# Patient Record
Sex: FEMALE | Race: WHITE | Employment: UNEMPLOYED | URBAN - METROPOLITAN AREA
[De-identification: names, ages, dates, MRNs, and addresses within clinical notes are randomized per-mention and may not be internally consistent; named-entity substitution may affect disease eponyms.]

---

## 2017-12-28 ENCOUNTER — HOSPITAL ENCOUNTER (EMERGENCY)
Facility: HOSPITAL | Age: 52
Discharge: HOME | End: 2017-12-28
Payer: MEDICAID

## 2017-12-28 ENCOUNTER — APPOINTMENT (OUTPATIENT)
Dept: GENERAL RADIOLOGY | Facility: HOSPITAL | Age: 52
End: 2017-12-28
Payer: MEDICAID

## 2017-12-28 VITALS
BODY MASS INDEX: 27.05 KG/M2 | DIASTOLIC BLOOD PRESSURE: 73 MMHG | RESPIRATION RATE: 22 BRPM | OXYGEN SATURATION: 96 % | SYSTOLIC BLOOD PRESSURE: 171 MMHG | HEART RATE: 111 BPM | HEIGHT: 62 IN | TEMPERATURE: 100 F | WEIGHT: 147 LBS

## 2017-12-28 DIAGNOSIS — J45.901 EXACERBATION OF ASTHMA, UNSPECIFIED ASTHMA SEVERITY, UNSPECIFIED WHETHER PERSISTENT: ICD-10-CM

## 2017-12-28 DIAGNOSIS — J18.0 BRONCHOPNEUMONIA: Primary | ICD-10-CM

## 2017-12-28 PROCEDURE — 71010 XR CHEST AP/PA (1 VIEW) (CPT=71010): CPT

## 2017-12-28 PROCEDURE — 99284 EMERGENCY DEPT VISIT MOD MDM: CPT

## 2017-12-28 PROCEDURE — 94640 AIRWAY INHALATION TREATMENT: CPT

## 2017-12-28 RX ORDER — DOXYCYCLINE 100 MG/1
100 CAPSULE ORAL ONCE
Status: COMPLETED | OUTPATIENT
Start: 2017-12-28 | End: 2017-12-28

## 2017-12-28 RX ORDER — PREDNISONE 20 MG/1
60 TABLET ORAL ONCE
Status: COMPLETED | OUTPATIENT
Start: 2017-12-28 | End: 2017-12-28

## 2017-12-28 RX ORDER — PREDNISONE 20 MG/1
60 TABLET ORAL DAILY
Qty: 12 TABLET | Refills: 0 | Status: SHIPPED | OUTPATIENT
Start: 2017-12-28 | End: 2018-01-01

## 2017-12-28 RX ORDER — IPRATROPIUM BROMIDE AND ALBUTEROL SULFATE 2.5; .5 MG/3ML; MG/3ML
3 SOLUTION RESPIRATORY (INHALATION) ONCE
Status: COMPLETED | OUTPATIENT
Start: 2017-12-28 | End: 2017-12-28

## 2017-12-28 RX ORDER — DOXYCYCLINE HYCLATE 100 MG/1
100 CAPSULE ORAL 2 TIMES DAILY
Qty: 20 CAPSULE | Refills: 0 | Status: SHIPPED | OUTPATIENT
Start: 2017-12-28 | End: 2018-01-07

## 2017-12-28 NOTE — ED INITIAL ASSESSMENT (HPI)
Pt to ER with c/o jeremy since yesterday. Pt with hx of asthma and states she has used all her \"treatments\" at home. Pt 95% on room air. Pt able to speak in full sentences. Bilateral lung sounds diminished. Pt denies fever at home. +new cough per patient.

## 2017-12-28 NOTE — ED NOTES
Pt states she has h/o asthma. Was at work today and started to have problems breathing. States had the stomach bug over the weekend. Has used her rescue inhaler and her breathing treatment several times over the past couple days. Denies fevers.

## 2017-12-29 NOTE — ED PROVIDER NOTES
Patient Seen in: [facility] AND [facility] Emergency Department    History   Patient presents with:  Dyspnea JAMEL SOB (respiratory)    Stated Complaint: cough, sob     HPI    46 yo F with PMH asthma presenting for evaluation of several days of worsening cough/dyspn Oral  SpO2: 95 %  O2 Device: None (Room air)    Current:BP (!) 149/125   Pulse 109   Temp 98.3 °F (36.8 °C) (Oral)   Resp 20   Ht 157.5 cm (5' 2\")   Wt 66.7 kg   SpO2 95%   BMI 26.89 kg/m²         Physical Exam   Constitutional: No distress. HEENT: MMM. as interpreted by myself    Evaluation for cough/wheezing without overt pain in nontoxic, well-appearing patient - CXR as noted in AF/HDS, notnoxic patient, will initiate abx. Improved after additional neb, patient desiring DC home.  DC home with abx as not

## 2018-02-20 ENCOUNTER — APPOINTMENT (OUTPATIENT)
Dept: GENERAL RADIOLOGY | Facility: HOSPITAL | Age: 53
End: 2018-02-20
Attending: NURSE PRACTITIONER
Payer: MEDICARE

## 2018-02-20 ENCOUNTER — HOSPITAL ENCOUNTER (EMERGENCY)
Facility: HOSPITAL | Age: 53
Discharge: HOME | End: 2018-02-20
Payer: MEDICARE

## 2018-02-20 VITALS
BODY MASS INDEX: 26.31 KG/M2 | HEART RATE: 78 BPM | TEMPERATURE: 98 F | DIASTOLIC BLOOD PRESSURE: 64 MMHG | WEIGHT: 143 LBS | HEIGHT: 62 IN | OXYGEN SATURATION: 98 % | RESPIRATION RATE: 20 BRPM | SYSTOLIC BLOOD PRESSURE: 107 MMHG

## 2018-02-20 DIAGNOSIS — S82.891A CLOSED FRACTURE OF RIGHT ANKLE, INITIAL ENCOUNTER: Primary | ICD-10-CM

## 2018-02-20 PROCEDURE — 73610 X-RAY EXAM OF ANKLE: CPT | Performed by: NURSE PRACTITIONER

## 2018-02-20 PROCEDURE — 99284 EMERGENCY DEPT VISIT MOD MDM: CPT

## 2018-02-20 PROCEDURE — 73590 X-RAY EXAM OF LOWER LEG: CPT | Performed by: NURSE PRACTITIONER

## 2018-02-20 RX ORDER — IBUPROFEN 600 MG/1
600 TABLET ORAL ONCE
Status: COMPLETED | OUTPATIENT
Start: 2018-02-20 | End: 2018-02-20

## 2018-02-20 RX ORDER — HYDROCODONE BITARTRATE AND ACETAMINOPHEN 5; 325 MG/1; MG/1
1-2 TABLET ORAL EVERY 4 HOURS PRN
Qty: 15 TABLET | Refills: 0 | Status: SHIPPED | OUTPATIENT
Start: 2018-02-20 | End: 2018-02-27

## 2018-02-20 RX ORDER — TRAMADOL HYDROCHLORIDE 50 MG/1
TABLET ORAL EVERY 4 HOURS PRN
Qty: 15 TABLET | Refills: 0 | Status: SHIPPED | OUTPATIENT
Start: 2018-02-20 | End: 2018-02-20 | Stop reason: CLARIF

## 2018-02-20 RX ORDER — TRAMADOL HYDROCHLORIDE 50 MG/1
50 TABLET ORAL ONCE
Status: COMPLETED | OUTPATIENT
Start: 2018-02-20 | End: 2018-02-20

## 2018-02-20 NOTE — ED PROVIDER NOTES
Patient Seen in: [facility] AND [facility] Emergency Department    History   Patient presents with:  Lower Extremity Injury (musculoskeletal)    Stated Complaint: Fall last night - Right ankle pain    Patient presents into the emergency room for evaluation of he right knee tibial plateau tenderness or deformity. There is tenderness on palpation to the lateral aspect of the right mid lower leg. No palpable tenderness to the medial malleolar area.   There is marked tenderness accompanied with moderate swelling to t as needed.   Qty: 15 tablet Refills: 0              Alyson November DEWEYP

## 2018-02-20 NOTE — CM/SW NOTE
Met with patient for discharge planning. She will need orthopedic follow up. Initially insurance listed was [place] [employer] yet it is showing not eligible.  She said she was just approved for social security disability and therefore may be medicare eligib

## 2018-02-26 PROBLEM — Z01.818 PRE-OP TESTING: Status: ACTIVE | Noted: 2018-02-26

## 2018-02-26 PROBLEM — S82.61XA CLOSED DISPLACED FRACTURE OF LATERAL MALLEOLUS OF RIGHT FIBULA: Status: ACTIVE | Noted: 2018-02-26

## 2018-02-26 PROBLEM — M25.571 ACUTE RIGHT ANKLE PAIN: Status: ACTIVE | Noted: 2018-02-26

## 2018-02-27 ENCOUNTER — LAB ENCOUNTER (OUTPATIENT)
Dept: LAB | Facility: HOSPITAL | Age: 53
DRG: 494 | End: 2018-02-27
Attending: ORTHOPAEDIC SURGERY
Payer: MEDICARE

## 2018-02-27 ENCOUNTER — HOSPITAL ENCOUNTER (OUTPATIENT)
Dept: GENERAL RADIOLOGY | Facility: HOSPITAL | Age: 53
Discharge: HOME | DRG: 494 | End: 2018-02-27
Attending: ORTHOPAEDIC SURGERY
Payer: MEDICARE

## 2018-02-27 DIAGNOSIS — Z01.818 PRE-OP TESTING: ICD-10-CM

## 2018-02-27 LAB
ALBUMIN SERPL BCP-MCNC: 3.8 G/DL (ref 3.5–4.8)
ALBUMIN/GLOB SERPL: 1.2 {RATIO} (ref 1–2)
ALP SERPL-CCNC: 46 U/L (ref 32–100)
ALT SERPL-CCNC: 25 U/L (ref 14–54)
ANION GAP SERPL CALC-SCNC: 9 MMOL/L (ref 0–18)
AST SERPL-CCNC: 26 U/L (ref 15–41)
BACTERIA UR QL AUTO: NEGATIVE /HPF
BASOPHILS # BLD: 0.1 K/UL (ref 0–0.2)
BASOPHILS NFR BLD: 1 %
BILIRUB SERPL-MCNC: 0.6 MG/DL (ref 0.3–1.2)
BILIRUB UR QL: NEGATIVE
BUN SERPL-MCNC: 15 MG/DL (ref 8–20)
BUN/CREAT SERPL: 27.3 (ref 10–20)
CALCIUM SERPL-MCNC: 9.2 MG/DL (ref 8.5–10.5)
CHLORIDE SERPL-SCNC: 102 MMOL/L (ref 95–110)
CLARITY UR: CLEAR
CO2 SERPL-SCNC: 29 MMOL/L (ref 22–32)
COLOR UR: YELLOW
CREAT SERPL-MCNC: 0.55 MG/DL (ref 0.5–1.5)
EOSINOPHIL # BLD: 0.2 K/UL (ref 0–0.7)
EOSINOPHIL NFR BLD: 2 %
ERYTHROCYTE [DISTWIDTH] IN BLOOD BY AUTOMATED COUNT: 14.4 % (ref 11–15)
GLOBULIN PLAS-MCNC: 3.3 G/DL (ref 2.5–3.7)
GLUCOSE SERPL-MCNC: 92 MG/DL (ref 70–99)
GLUCOSE UR-MCNC: NEGATIVE MG/DL
HCT VFR BLD AUTO: 41.2 % (ref 35–48)
HGB BLD-MCNC: 13.9 G/DL (ref 12–16)
HGB UR QL STRIP.AUTO: NEGATIVE
HYALINE CASTS #/AREA URNS AUTO: 1 /LPF
KETONES UR-MCNC: NEGATIVE MG/DL
LEUKOCYTE ESTERASE UR QL STRIP.AUTO: NEGATIVE
LYMPHOCYTES # BLD: 2.6 K/UL (ref 1–4)
LYMPHOCYTES NFR BLD: 23 %
MCH RBC QN AUTO: 32.4 PG (ref 27–32)
MCHC RBC AUTO-ENTMCNC: 33.8 G/DL (ref 32–37)
MCV RBC AUTO: 96 FL (ref 80–100)
MONOCYTES # BLD: 0.8 K/UL (ref 0–1)
MONOCYTES NFR BLD: 7 %
NEUTROPHILS # BLD AUTO: 7.6 K/UL (ref 1.8–7.7)
NEUTROPHILS NFR BLD: 67 %
NITRITE UR QL STRIP.AUTO: NEGATIVE
OSMOLALITY UR CALC.SUM OF ELEC: 290 MOSM/KG (ref 275–295)
PATIENT FASTING: YES
PH UR: 5 [PH] (ref 5–8)
PLATELET # BLD AUTO: 295 K/UL (ref 140–400)
PMV BLD AUTO: 7.3 FL (ref 7.4–10.3)
POTASSIUM SERPL-SCNC: 3.8 MMOL/L (ref 3.3–5.1)
PROT SERPL-MCNC: 7.1 G/DL (ref 5.9–8.4)
PROT UR-MCNC: 30 MG/DL
RBC # BLD AUTO: 4.29 M/UL (ref 3.7–5.4)
RBC #/AREA URNS AUTO: 2 /HPF
SODIUM SERPL-SCNC: 140 MMOL/L (ref 136–144)
SP GR UR STRIP: 1.03 (ref 1–1.03)
UROBILINOGEN UR STRIP-ACNC: <2
VIT C UR-MCNC: NEGATIVE MG/DL
WBC # BLD AUTO: 11.3 K/UL (ref 4–11)
WBC #/AREA URNS AUTO: 1 /HPF

## 2018-02-27 PROCEDURE — 36415 COLL VENOUS BLD VENIPUNCTURE: CPT

## 2018-02-27 PROCEDURE — 93010 ELECTROCARDIOGRAM REPORT: CPT | Performed by: ORTHOPAEDIC SURGERY

## 2018-02-27 PROCEDURE — 71046 X-RAY EXAM CHEST 2 VIEWS: CPT | Performed by: ORTHOPAEDIC SURGERY

## 2018-02-27 PROCEDURE — 80053 COMPREHEN METABOLIC PANEL: CPT

## 2018-02-27 PROCEDURE — 81001 URINALYSIS AUTO W/SCOPE: CPT

## 2018-02-27 PROCEDURE — 93005 ELECTROCARDIOGRAM TRACING: CPT

## 2018-02-27 PROCEDURE — 85025 COMPLETE CBC W/AUTO DIFF WBC: CPT

## 2018-02-27 RX ORDER — IBUPROFEN 400 MG/1
400 TABLET ORAL EVERY 6 HOURS PRN
Status: ON HOLD | COMMUNITY
End: 2018-02-28

## 2018-02-28 ENCOUNTER — ANESTHESIA (OUTPATIENT)
Dept: SURGERY | Facility: HOSPITAL | Age: 53
DRG: 494 | End: 2018-02-28
Payer: MEDICARE

## 2018-02-28 ENCOUNTER — HOSPITAL ENCOUNTER (INPATIENT)
Facility: HOSPITAL | Age: 53
LOS: 5 days | Discharge: NURSING, REHAB OR LONG-TERM CARE | DRG: 494 | End: 2018-03-05
Attending: ORTHOPAEDIC SURGERY | Admitting: ORTHOPAEDIC SURGERY
Payer: MEDICARE

## 2018-02-28 ENCOUNTER — APPOINTMENT (OUTPATIENT)
Dept: GENERAL RADIOLOGY | Facility: HOSPITAL | Age: 53
DRG: 494 | End: 2018-02-28
Attending: ORTHOPAEDIC SURGERY
Payer: MEDICARE

## 2018-02-28 ENCOUNTER — SURGERY (OUTPATIENT)
Age: 53
End: 2018-02-28

## 2018-02-28 ENCOUNTER — ANESTHESIA EVENT (OUTPATIENT)
Dept: SURGERY | Facility: HOSPITAL | Age: 53
DRG: 494 | End: 2018-02-28
Payer: MEDICARE

## 2018-02-28 DIAGNOSIS — S82.61XA CLOSED DISPLACED FRACTURE OF LATERAL MALLEOLUS OF RIGHT FIBULA, INITIAL ENCOUNTER: Primary | ICD-10-CM

## 2018-02-28 PROBLEM — S82.841A BIMALLEOLAR ANKLE FRACTURE, RIGHT, CLOSED, INITIAL ENCOUNTER: Status: ACTIVE | Noted: 2018-02-28

## 2018-02-28 LAB
ERYTHROCYTE [DISTWIDTH] IN BLOOD BY AUTOMATED COUNT: 14.7 % (ref 11–15)
HCT VFR BLD AUTO: 37.7 % (ref 35–48)
HGB BLD-MCNC: 12.9 G/DL (ref 12–16)
MCH RBC QN AUTO: 33.4 PG (ref 27–32)
MCHC RBC AUTO-ENTMCNC: 34.4 G/DL (ref 32–37)
MCV RBC AUTO: 97 FL (ref 80–100)
PLATELET # BLD AUTO: 272 K/UL (ref 140–400)
PMV BLD AUTO: 6.8 FL (ref 7.4–10.3)
RBC # BLD AUTO: 3.88 M/UL (ref 3.7–5.4)
WBC # BLD AUTO: 8 K/UL (ref 4–11)

## 2018-02-28 PROCEDURE — 76001 XR C-ARM FLUORO >1 HOUR  (CPT=76001): CPT | Performed by: ORTHOPAEDIC SURGERY

## 2018-02-28 PROCEDURE — 73610 X-RAY EXAM OF ANKLE: CPT | Performed by: ORTHOPAEDIC SURGERY

## 2018-02-28 PROCEDURE — 0QSJ04Z REPOSITION RIGHT FIBULA WITH INTERNAL FIXATION DEVICE, OPEN APPROACH: ICD-10-PCS | Performed by: ORTHOPAEDIC SURGERY

## 2018-02-28 DEVICE — SCREW LOCKING 3.5X16 212.104: Type: IMPLANTABLE DEVICE | Site: ANKLE | Status: FUNCTIONAL

## 2018-02-28 DEVICE — SCREW LOCKING 3.5X14 212.103: Type: IMPLANTABLE DEVICE | Site: ANKLE | Status: FUNCTIONAL

## 2018-02-28 DEVICE — SCREW CRTX 3.5X46MM ST: Type: IMPLANTABLE DEVICE | Site: ANKLE | Status: FUNCTIONAL

## 2018-02-28 DEVICE — PLATE LCP TUB 1/3 7H 241.371: Type: IMPLANTABLE DEVICE | Site: ANKLE | Status: FUNCTIONAL

## 2018-02-28 DEVICE — SCREW LOCKING 3.5X12 212.102: Type: IMPLANTABLE DEVICE | Site: ANKLE | Status: FUNCTIONAL

## 2018-02-28 RX ORDER — METOCLOPRAMIDE 10 MG/1
10 TABLET ORAL ONCE
Status: DISCONTINUED | OUTPATIENT
Start: 2018-02-28 | End: 2018-02-28 | Stop reason: HOSPADM

## 2018-02-28 RX ORDER — NALOXONE HYDROCHLORIDE 0.4 MG/ML
80 INJECTION, SOLUTION INTRAMUSCULAR; INTRAVENOUS; SUBCUTANEOUS AS NEEDED
Status: DISCONTINUED | OUTPATIENT
Start: 2018-02-28 | End: 2018-02-28 | Stop reason: HOSPADM

## 2018-02-28 RX ORDER — MAGNESIUM HYDROXIDE 1200 MG/15ML
LIQUID ORAL CONTINUOUS PRN
Status: DISCONTINUED | OUTPATIENT
Start: 2018-02-28 | End: 2018-02-28

## 2018-02-28 RX ORDER — SODIUM PHOSPHATE, DIBASIC AND SODIUM PHOSPHATE, MONOBASIC 7; 19 G/133ML; G/133ML
1 ENEMA RECTAL ONCE AS NEEDED
Status: DISCONTINUED | OUTPATIENT
Start: 2018-02-28 | End: 2018-03-05

## 2018-02-28 RX ORDER — SODIUM CHLORIDE, SODIUM LACTATE, POTASSIUM CHLORIDE, CALCIUM CHLORIDE 600; 310; 30; 20 MG/100ML; MG/100ML; MG/100ML; MG/100ML
INJECTION, SOLUTION INTRAVENOUS CONTINUOUS
Status: DISCONTINUED | OUTPATIENT
Start: 2018-02-28 | End: 2018-03-05

## 2018-02-28 RX ORDER — MORPHINE SULFATE 2 MG/ML
2 INJECTION, SOLUTION INTRAMUSCULAR; INTRAVENOUS EVERY 2 HOUR PRN
Status: DISCONTINUED | OUTPATIENT
Start: 2018-02-28 | End: 2018-03-05

## 2018-02-28 RX ORDER — HYDROMORPHONE HYDROCHLORIDE 1 MG/ML
0.5 INJECTION, SOLUTION INTRAMUSCULAR; INTRAVENOUS; SUBCUTANEOUS EVERY 5 MIN PRN
Status: DISCONTINUED | OUTPATIENT
Start: 2018-02-28 | End: 2018-02-28 | Stop reason: HOSPADM

## 2018-02-28 RX ORDER — MORPHINE SULFATE 1 MG/ML
5 INJECTION, SOLUTION EPIDURAL; INTRATHECAL; INTRAVENOUS ONCE
Status: COMPLETED | OUTPATIENT
Start: 2018-02-28 | End: 2018-02-28

## 2018-02-28 RX ORDER — HYDROCODONE BITARTRATE AND ACETAMINOPHEN 5; 325 MG/1; MG/1
1 TABLET ORAL EVERY 4 HOURS PRN
Status: DISCONTINUED | OUTPATIENT
Start: 2018-02-28 | End: 2018-02-28

## 2018-02-28 RX ORDER — ONDANSETRON 2 MG/ML
4 INJECTION INTRAMUSCULAR; INTRAVENOUS EVERY 6 HOURS PRN
Status: DISCONTINUED | OUTPATIENT
Start: 2018-02-28 | End: 2018-03-05

## 2018-02-28 RX ORDER — LIDOCAINE HYDROCHLORIDE 20 MG/ML
INJECTION, SOLUTION EPIDURAL; INFILTRATION; INTRACAUDAL; PERINEURAL AS NEEDED
Status: DISCONTINUED | OUTPATIENT
Start: 2018-02-28 | End: 2018-02-28 | Stop reason: SURG

## 2018-02-28 RX ORDER — CEFAZOLIN SODIUM/WATER 2 G/20 ML
2 SYRINGE (ML) INTRAVENOUS ONCE
Status: DISCONTINUED | OUTPATIENT
Start: 2018-03-01 | End: 2018-02-28

## 2018-02-28 RX ORDER — MONTELUKAST SODIUM 10 MG/1
10 TABLET ORAL NIGHTLY
Status: DISCONTINUED | OUTPATIENT
Start: 2018-02-28 | End: 2018-03-05

## 2018-02-28 RX ORDER — MORPHINE SULFATE 4 MG/ML
4 INJECTION, SOLUTION INTRAMUSCULAR; INTRAVENOUS EVERY 10 MIN PRN
Status: DISCONTINUED | OUTPATIENT
Start: 2018-02-28 | End: 2018-02-28 | Stop reason: HOSPADM

## 2018-02-28 RX ORDER — CEFAZOLIN SODIUM/WATER 2 G/20 ML
2 SYRINGE (ML) INTRAVENOUS EVERY 8 HOURS
Status: COMPLETED | OUTPATIENT
Start: 2018-02-28 | End: 2018-03-01

## 2018-02-28 RX ORDER — MORPHINE SULFATE 2 MG/ML
1 INJECTION, SOLUTION INTRAMUSCULAR; INTRAVENOUS EVERY 2 HOUR PRN
Status: DISCONTINUED | OUTPATIENT
Start: 2018-02-28 | End: 2018-03-05

## 2018-02-28 RX ORDER — MIDAZOLAM HYDROCHLORIDE 1 MG/ML
INJECTION INTRAMUSCULAR; INTRAVENOUS AS NEEDED
Status: DISCONTINUED | OUTPATIENT
Start: 2018-02-28 | End: 2018-02-28 | Stop reason: SURG

## 2018-02-28 RX ORDER — FAMOTIDINE 20 MG/1
20 TABLET ORAL ONCE
Status: DISCONTINUED | OUTPATIENT
Start: 2018-02-28 | End: 2018-02-28 | Stop reason: HOSPADM

## 2018-02-28 RX ORDER — MORPHINE SULFATE 10 MG/ML
6 INJECTION, SOLUTION INTRAMUSCULAR; INTRAVENOUS EVERY 10 MIN PRN
Status: DISCONTINUED | OUTPATIENT
Start: 2018-02-28 | End: 2018-02-28 | Stop reason: HOSPADM

## 2018-02-28 RX ORDER — DEXAMETHASONE SODIUM PHOSPHATE 4 MG/ML
VIAL (ML) INJECTION AS NEEDED
Status: DISCONTINUED | OUTPATIENT
Start: 2018-02-28 | End: 2018-02-28 | Stop reason: SURG

## 2018-02-28 RX ORDER — HYDROCODONE BITARTRATE AND ACETAMINOPHEN 7.5; 325 MG/1; MG/1
2 TABLET ORAL EVERY 6 HOURS PRN
Status: DISCONTINUED | OUTPATIENT
Start: 2018-02-28 | End: 2018-03-05

## 2018-02-28 RX ORDER — ONDANSETRON 2 MG/ML
INJECTION INTRAMUSCULAR; INTRAVENOUS AS NEEDED
Status: DISCONTINUED | OUTPATIENT
Start: 2018-02-28 | End: 2018-02-28 | Stop reason: SURG

## 2018-02-28 RX ORDER — SODIUM CHLORIDE, SODIUM LACTATE, POTASSIUM CHLORIDE, CALCIUM CHLORIDE 600; 310; 30; 20 MG/100ML; MG/100ML; MG/100ML; MG/100ML
INJECTION, SOLUTION INTRAVENOUS CONTINUOUS
Status: DISCONTINUED | OUTPATIENT
Start: 2018-02-28 | End: 2018-02-28 | Stop reason: HOSPADM

## 2018-02-28 RX ORDER — ACETAMINOPHEN 325 MG/1
650 TABLET ORAL EVERY 4 HOURS PRN
Status: DISCONTINUED | OUTPATIENT
Start: 2018-02-28 | End: 2018-03-05

## 2018-02-28 RX ORDER — MORPHINE SULFATE 4 MG/ML
4 INJECTION, SOLUTION INTRAMUSCULAR; INTRAVENOUS EVERY 2 HOUR PRN
Status: DISCONTINUED | OUTPATIENT
Start: 2018-02-28 | End: 2018-03-05

## 2018-02-28 RX ORDER — DIPHENHYDRAMINE HYDROCHLORIDE 50 MG/ML
25 INJECTION INTRAMUSCULAR; INTRAVENOUS ONCE
Status: COMPLETED | OUTPATIENT
Start: 2018-02-28 | End: 2018-02-28

## 2018-02-28 RX ORDER — SODIUM CHLORIDE 0.9 % (FLUSH) 0.9 %
10 SYRINGE (ML) INJECTION AS NEEDED
Status: DISCONTINUED | OUTPATIENT
Start: 2018-02-28 | End: 2018-03-05

## 2018-02-28 RX ORDER — CITALOPRAM 40 MG/1
40 TABLET ORAL NIGHTLY
Status: DISCONTINUED | OUTPATIENT
Start: 2018-02-28 | End: 2018-03-05

## 2018-02-28 RX ORDER — ONDANSETRON 2 MG/ML
4 INJECTION INTRAMUSCULAR; INTRAVENOUS ONCE AS NEEDED
Status: DISCONTINUED | OUTPATIENT
Start: 2018-02-28 | End: 2018-02-28 | Stop reason: HOSPADM

## 2018-02-28 RX ORDER — ALBUTEROL SULFATE 90 UG/1
1 AEROSOL, METERED RESPIRATORY (INHALATION) EVERY 6 HOURS PRN
Status: DISCONTINUED | OUTPATIENT
Start: 2018-02-28 | End: 2018-03-05

## 2018-02-28 RX ORDER — BISACODYL 10 MG
10 SUPPOSITORY, RECTAL RECTAL
Status: DISCONTINUED | OUTPATIENT
Start: 2018-02-28 | End: 2018-03-05

## 2018-02-28 RX ORDER — CEFAZOLIN SODIUM/WATER 2 G/20 ML
2 SYRINGE (ML) INTRAVENOUS ONCE
Status: COMPLETED | OUTPATIENT
Start: 2018-02-28 | End: 2018-02-28

## 2018-02-28 RX ORDER — MORPHINE SULFATE 2 MG/ML
2 INJECTION, SOLUTION INTRAMUSCULAR; INTRAVENOUS EVERY 10 MIN PRN
Status: DISCONTINUED | OUTPATIENT
Start: 2018-02-28 | End: 2018-02-28 | Stop reason: HOSPADM

## 2018-02-28 RX ORDER — ACETAMINOPHEN 500 MG
1000 TABLET ORAL ONCE
Status: COMPLETED | OUTPATIENT
Start: 2018-02-28 | End: 2018-02-28

## 2018-02-28 RX ORDER — HYDROCODONE BITARTRATE AND ACETAMINOPHEN 7.5; 325 MG/1; MG/1
1 TABLET ORAL EVERY 4 HOURS PRN
Status: DISCONTINUED | OUTPATIENT
Start: 2018-02-28 | End: 2018-03-05

## 2018-02-28 RX ORDER — HYDROCODONE BITARTRATE AND ACETAMINOPHEN 5; 325 MG/1; MG/1
1 TABLET ORAL AS NEEDED
Status: COMPLETED | OUTPATIENT
Start: 2018-02-28 | End: 2018-02-28

## 2018-02-28 RX ORDER — HYDROCODONE BITARTRATE AND ACETAMINOPHEN 5; 325 MG/1; MG/1
2 TABLET ORAL EVERY 4 HOURS PRN
Status: DISCONTINUED | OUTPATIENT
Start: 2018-02-28 | End: 2018-02-28

## 2018-02-28 RX ORDER — HYDROCODONE BITARTRATE AND ACETAMINOPHEN 5; 325 MG/1; MG/1
2 TABLET ORAL AS NEEDED
Status: COMPLETED | OUTPATIENT
Start: 2018-02-28 | End: 2018-02-28

## 2018-02-28 RX ORDER — DOCUSATE SODIUM 100 MG/1
100 CAPSULE, LIQUID FILLED ORAL 2 TIMES DAILY
Status: DISCONTINUED | OUTPATIENT
Start: 2018-02-28 | End: 2018-03-05

## 2018-02-28 RX ORDER — POLYETHYLENE GLYCOL 3350 17 G/17G
17 POWDER, FOR SOLUTION ORAL DAILY PRN
Status: DISCONTINUED | OUTPATIENT
Start: 2018-02-28 | End: 2018-03-05

## 2018-02-28 RX ORDER — GLYCOPYRROLATE 0.2 MG/ML
INJECTION INTRAMUSCULAR; INTRAVENOUS AS NEEDED
Status: DISCONTINUED | OUTPATIENT
Start: 2018-02-28 | End: 2018-02-28 | Stop reason: SURG

## 2018-02-28 RX ORDER — HALOPERIDOL 5 MG/ML
0.25 INJECTION INTRAMUSCULAR ONCE AS NEEDED
Status: DISCONTINUED | OUTPATIENT
Start: 2018-02-28 | End: 2018-02-28 | Stop reason: HOSPADM

## 2018-02-28 RX ADMIN — ONDANSETRON 4 MG: 2 INJECTION INTRAMUSCULAR; INTRAVENOUS at 07:33:00

## 2018-02-28 RX ADMIN — SODIUM CHLORIDE, SODIUM LACTATE, POTASSIUM CHLORIDE, CALCIUM CHLORIDE: 600; 310; 30; 20 INJECTION, SOLUTION INTRAVENOUS at 09:43:00

## 2018-02-28 RX ADMIN — CEFAZOLIN SODIUM/WATER 2 G: 2 G/20 ML SYRINGE (ML) INTRAVENOUS at 07:45:00

## 2018-02-28 RX ADMIN — MIDAZOLAM HYDROCHLORIDE 2 MG: 1 INJECTION INTRAMUSCULAR; INTRAVENOUS at 07:30:00

## 2018-02-28 RX ADMIN — GLYCOPYRROLATE 0.2 MG: 0.2 INJECTION INTRAMUSCULAR; INTRAVENOUS at 07:33:00

## 2018-02-28 RX ADMIN — DEXAMETHASONE SODIUM PHOSPHATE 4 MG: 4 MG/ML VIAL (ML) INJECTION at 07:33:00

## 2018-02-28 RX ADMIN — LIDOCAINE HYDROCHLORIDE 50 MG: 20 INJECTION, SOLUTION EPIDURAL; INFILTRATION; INTRACAUDAL; PERINEURAL at 07:34:00

## 2018-02-28 NOTE — H&P
[facility] HOSP - [facility]    History and [address] [facility] Patient Status:  Hospital Outpatient Surgery    1965 MRN [id number]   Location [facility] UNIT Attending Iris Clark MD   Hosp Day # 0 PCP K temperature source Temporal, resp. rate 17, height 62\", weight 148 lb, SpO2 99 %, not currently breastfeeding. Nursing note and vitals reviewed. Constitutional: She appears well-developed and well-nourished. HENT:   Head: Normocephalic.    Eyes: Con Bettyjane Dakin, MD      Assessment/Plan:     Closed displaced fracture of lateral malleolus of right fibula  S/p orif/ admit/monitor      Bimalleolar ankle fracture, right, closed, initial encounter  Status post ORIF.   Admit for 23 hour observation  COPD  We will

## 2018-02-28 NOTE — BRIEF OP NOTE
Jake 44   Brief Op Note    Patients Name: Jeri Guy  Attending Physician: Dev Peters MD  Operating Physician: Rickie Walker MD  CSN: [id number]     Location:  OR  MRN: [id number]    YOB: 1965  Adm

## 2018-02-28 NOTE — ANESTHESIA PREPROCEDURE EVALUATION
Anesthesia PreOp Note    HPI:     Ana Laura Abraham is a 46year old female who presents for preoperative consultation requested by: Elke Mane MD    Date of Surgery: 2/28/2018    Procedure(s):  ANKLE OPEN REDUCTION INTERNAL FIXATION  Indication: mg 10 mg Oral Once Taye Mcclure MD    [facility] Hold] ceFAZolin sodium (ANCEF/KEFZOL) 2 GM/20ML premix IV syringe 2 g 2 g Intravenous Once Taye Mcclure MD      No current [facility]-ordered outpatient prescriptions on file.     No Known Allergies    Histor Mallampati: I  TM distance: >3 FB  Neck ROM: full  Dental - normal exam     Pulmonary - normal exam   (+) asthma,   Cardiovascular - negative ROS and normal exam  Exercise tolerance: good    Neuro/Psych    (+) psychiatric history (BIPOLAR)    GI/Hepatic/

## 2018-02-28 NOTE — OPERATIVE REPORT
[facility]    PATIENT'S NAME: Oliver Gonzáles   ATTENDING PHYSICIAN: Parish Loya MD   OPERATING PHYSICIAN: Ambreen Seay MD   PATIENT ACCOUNT#:   [id number]    LOCATION:  [facility] [address] PACU [address] [place] [address]  MEDICAL RECORD #:   [id number] sharp dissection, gently curetted, and copiously irrigated. The fibula was brought out to length, anatomically reduced, and a 7-hole one-third semitubular locked Synthes plate was contoured, placed against the fibula, held with a clamp.   Position alignmen and the skin closed with staples. The Hemovac drain was secured with benzoin, Steri-Strips, and Tegaderm, connected, and engaged. A sterile dressing was applied, including a very well-padded, well-molded posterior mold splint was applied.   The patient se

## 2018-02-28 NOTE — ANESTHESIA POSTPROCEDURE EVALUATION
Patient: Loli Barreto    Procedure Summary     Date:  02/28/18 Room / Location:  [address] [place] [address] MAIN OR 12 / [address] [place] [address] MAIN OR    Anesthesia Start:  0730 Anesthesia Stop:  1942    Procedure:  ANKLE OPEN REDUCTION INTERNAL FIXATION (Right Ankle) Diagnosis:  (displaced

## 2018-02-28 NOTE — CONSULTS
Erika Ochoa MD   SURGERY, ORTHOPEDIC      []Manual[]Template  []Copied  HPI:   Carley Hutchinson: Ms. Devonte Baldwin presents as a 54-year-old female who sustained a twisting injury/fall to her right ankle while walking down ice coated stairs in [facility] --------------------------------------------------------------------------------------------------------------------------------------------------------------------------------------------------------------------------------------------     PROCEDURE:  XR GI: denies abdominal pain and denies heartburn  EXTREMITIES: denies numbness, tingling, or weakness  NEURO: denies headaches.         Current Outpatient Prescriptions:  Albuterol (VENTOLIN IN) Inhale into the lungs.  Disp:  Rfl:    Fluticasone-Salmeterol (A · Ankle deformity cannot be ruled out to location of the fracture site.      · There is decreased range of motion of the ankle present which elicits pain.      · Good range of motion of the knee, ankle and foot.      · The pain is exacerbated by not only a XR CHEST PA + LAT CHEST (CPT=71046)  EKG 12-LEAD     The extent of the patient’s injury, degree of comminution, displacement, intraarticular extension and osteopenia were discussed and explained in great detail as were the various treatment options.  All be Numerous questions were asked, all of which were answered to the best of my abilities, and it was felt that informed consent had been obtained.      The patient was told that if they have a good understanding of the above discussion, feels the potential be

## 2018-03-01 LAB
ANION GAP SERPL CALC-SCNC: 6 MMOL/L (ref 0–18)
BASOPHILS # BLD: 0.1 K/UL (ref 0–0.2)
BASOPHILS NFR BLD: 1 %
BUN SERPL-MCNC: 9 MG/DL (ref 8–20)
BUN/CREAT SERPL: 18 (ref 10–20)
CALCIUM SERPL-MCNC: 8.4 MG/DL (ref 8.5–10.5)
CHLORIDE SERPL-SCNC: 104 MMOL/L (ref 95–110)
CO2 SERPL-SCNC: 28 MMOL/L (ref 22–32)
CREAT SERPL-MCNC: 0.5 MG/DL (ref 0.5–1.5)
EOSINOPHIL # BLD: 0.2 K/UL (ref 0–0.7)
EOSINOPHIL NFR BLD: 2 %
ERYTHROCYTE [DISTWIDTH] IN BLOOD BY AUTOMATED COUNT: 14.5 % (ref 11–15)
GLUCOSE SERPL-MCNC: 122 MG/DL (ref 70–99)
HCT VFR BLD AUTO: 33.7 % (ref 35–48)
HGB BLD-MCNC: 11.4 G/DL (ref 12–16)
LYMPHOCYTES # BLD: 2.3 K/UL (ref 1–4)
LYMPHOCYTES NFR BLD: 23 %
MCH RBC QN AUTO: 33.1 PG (ref 27–32)
MCHC RBC AUTO-ENTMCNC: 33.8 G/DL (ref 32–37)
MCV RBC AUTO: 97.9 FL (ref 80–100)
MONOCYTES # BLD: 1 K/UL (ref 0–1)
MONOCYTES NFR BLD: 10 %
NEUTROPHILS # BLD AUTO: 6.4 K/UL (ref 1.8–7.7)
NEUTROPHILS NFR BLD: 64 %
OSMOLALITY UR CALC.SUM OF ELEC: 286 MOSM/KG (ref 275–295)
PLATELET # BLD AUTO: 242 K/UL (ref 140–400)
PMV BLD AUTO: 7.2 FL (ref 7.4–10.3)
POTASSIUM SERPL-SCNC: 3.6 MMOL/L (ref 3.3–5.1)
RBC # BLD AUTO: 3.44 M/UL (ref 3.7–5.4)
SODIUM SERPL-SCNC: 138 MMOL/L (ref 136–144)
WBC # BLD AUTO: 9.9 K/UL (ref 4–11)

## 2018-03-01 RX ORDER — FUROSEMIDE 10 MG/ML
20 INJECTION INTRAMUSCULAR; INTRAVENOUS ONCE
Status: COMPLETED | OUTPATIENT
Start: 2018-03-01 | End: 2018-03-01

## 2018-03-01 RX ORDER — ALPRAZOLAM 0.5 MG/1
0.5 TABLET ORAL EVERY 6 HOURS PRN
Status: DISCONTINUED | OUTPATIENT
Start: 2018-03-01 | End: 2018-03-05

## 2018-03-01 NOTE — PHYSICAL THERAPY NOTE
PHYSICAL THERAPY EVALUATION - INPATIENT     Room Number: 432/432-A  Evaluation Date: 3/1/2018  Type of Evaluation: Initial   Physician Order: PT Eval and Treat    Presenting Problem: s/p ORIF distal fibular fx  Reason for Therapy: Mobility Dysfunction and \"46year-old white female admitted for elective ORIF of ankle fracture. Please see or notes.   Patient underwent procedure and is now in PACU\"     Problem List  Active Problems:    Closed displaced fracture of lateral malleolus of right fibula    Bimalle -      ACTIVITY TOLERANCE  Room air    AM-PAC '6-Clicks' INPATIENT SHORT FORM - BASIC MOBILITY  How much difficulty does the patient currently have. ..  -   Turning over in bed (including adjusting bedclothes, sheets and blankets)?: A Little   -   Sitting d independence with home activity/exercise instructions provided to patient in preparation for discharge.    Goal #4   Current Status IN PROGRESS   Goal #5    Goal #5   Current Status    Goal #6    Goal #6  Current Status

## 2018-03-01 NOTE — PROGRESS NOTES
[facility]    Progress Note    Sallyvielka Wing Patient Status:  Observation    1965 MRN [id number]   Location [facility] 4W/SW/SE Attending Madaline Seip, MD   Hosp Day # 0 PCP Jeevan Wolfe MD     Subjective:     Cons 03/01/2018   K 3.6 03/01/2018    03/01/2018   CO2 28 03/01/2018    (H) 03/01/2018   CA 8.4 (L) 03/01/2018   ALB 3.8 02/27/2018   ALKPHO 46 02/27/2018   BILT 0.6 02/27/2018   TP 7.1 02/27/2018   AST 26 02/27/2018   ALT 25 02/27/2018       Xr An

## 2018-03-01 NOTE — PROGRESS NOTES
[facility]    Progress Note    Jimenez Gorman Patient Status:  Observation    1965 MRN [id number]   Location [facility] 4W/SW/SE Attending Samreen Contreras MD   Hosp Day # 0 PCP Yanci Valdovinos MD     Date of Admission:   GM/118ML enema 133 mL 1 enema Rectal Once PRN   morphINE sulfate (PF) 2 MG/ML injection 1 mg 1 mg Intravenous Q2H PRN   Or      morphINE sulfate (PF) 2 MG/ML injection 2 mg 2 mg Intravenous Q2H PRN   Or      morphINE sulfate (PF) 4 MG/ML injection 4 mg 4 m examination and no apparent distress. Her pulse and respiratory rate appear normal and regular. There is no active drainage on the dressing. Hemovac drainage is minimal.  Her distal neurovascular status appears unchanged and intact.   There is good color

## 2018-03-01 NOTE — OCCUPATIONAL THERAPY NOTE
OCCUPATIONAL THERAPY EVALUATION - INPATIENT      Room Number: 432/432-A  Evaluation Date: 3/1/2018  Type of Evaluation: Initial       Physician Order: IP Consult to Occupational Therapy  Reason for Therapy: ADL/IADL Dysfunction and Discharge Planning    OC History  Past Medical History:   Diagnosis Date   • Asthma    • Bipolar 1 disorder, depressed ([facility] [address]    • Depression    • Extrinsic asthma, unspecified    • Fecal incontinence    • History of blood transfusion     8/2017- no reaction       Past Surgical His o x 4   some decreased insight - hyper - overly talkative and at times tangential     RANGE OF MOTION   Upper extremity ROM is within functional limits     STRENGTH ASSESSMENT  Upper extremity strength is within functional limits       ACTIVITIES OF DAILY complete self care task at sink level with MOD I    Comment:    Patient will participate in RW level IADL related task MOD I   Comment:         Goals  on: 3/17  Frequency: 1-2 more visits

## 2018-03-02 LAB — HGB BLD-MCNC: 11.4 G/DL (ref 12–16)

## 2018-03-02 PROCEDURE — 90792 PSYCH DIAG EVAL W/MED SRVCS: CPT | Performed by: OTHER

## 2018-03-02 RX ORDER — FUROSEMIDE 10 MG/ML
20 INJECTION INTRAMUSCULAR; INTRAVENOUS ONCE
Status: DISCONTINUED | OUTPATIENT
Start: 2018-03-02 | End: 2018-03-02

## 2018-03-02 RX ORDER — FUROSEMIDE 20 MG/1
20 TABLET ORAL ONCE
Status: COMPLETED | OUTPATIENT
Start: 2018-03-02 | End: 2018-03-02

## 2018-03-02 RX ORDER — QUETIAPINE 25 MG/1
50 TABLET, FILM COATED ORAL NIGHTLY
Status: DISCONTINUED | OUTPATIENT
Start: 2018-03-02 | End: 2018-03-03

## 2018-03-02 NOTE — OCCUPATIONAL THERAPY NOTE
Pt not seen for OT this date with pt exiting bathroom, maintaining NWB RLE, using RW for support, stating that she just dressed and bathed self with no assist. Will plan to sign off from OT and discharge pt from OT at this time.  Pt states she feels she kya

## 2018-03-02 NOTE — CM/SW NOTE
KAELYN met with the pt. At bedside. The pt. Lives with her mother, son and family friend in a 2 level home with her bedroom on the main level. The pt. Reports being independent prior to admission with adls, ambulation and driving. The pt.  Stated she was nikita

## 2018-03-02 NOTE — PHYSICAL THERAPY NOTE
PHYSICAL THERAPY TREATMENT NOTE - INPATIENT     Room Number: 432/432-A       Presenting Problem: s/p ORIF distal fibular fx    Problem List  Active Problems:    Closed displaced fracture of lateral malleolus of right fibula    Bimalleolar ankle fracture, r need...   -   Moving to and from a bed to a chair (including a wheelchair)?: A Little   -   Need to walk in hospital room?: A Little   -   Climbing 3-5 steps with a railing?: A Little     AM-PAC Score:  Raw Score: 18   PT Approx Degree of Impairment Score:

## 2018-03-02 NOTE — PROGRESS NOTES
[facility]    Progress Note    Laestrella Marialers Patient Status:  Inpatient    1965 MRN [id number]   Location [facility] 4W/SW/SE Attending Shoaib Call MD   Hosp Day # 2 PCP Lucho Hilliard MD     Date of Admission:   hydroxide (MILK OF MAGNESIA) 400 MG/5ML suspension 30 mL 30 mL Oral Daily PRN   bisacodyl (DULCOLAX) rectal suppository 10 mg 10 mg Rectal Daily PRN   FLEET ENEMA (FLEET) 7-19 GM/118ML enema 133 mL 1 enema Rectal Once PRN   morphINE sulfate (PF) 2 MG/ML in Location: Right arm)   Pulse 66   Temp 97.8 °F (36.6 °C) (Oral)   Resp 18   Ht 62\"   Wt 148 lb   SpO2 97%   BMI 27.07 kg/m²   She is alert, oriented, and appropriate throughout the examination and no apparent distress. Her dressings were changed.   There content only. Typographical errors may remain.       Phoenix Stock MD  3/2/2018

## 2018-03-03 LAB — HGB BLD-MCNC: 11.8 G/DL (ref 12–16)

## 2018-03-03 PROCEDURE — 99231 SBSQ HOSP IP/OBS SF/LOW 25: CPT | Performed by: INTERNAL MEDICINE

## 2018-03-03 PROCEDURE — 99232 SBSQ HOSP IP/OBS MODERATE 35: CPT | Performed by: OTHER

## 2018-03-03 RX ORDER — QUETIAPINE 25 MG/1
25 TABLET, FILM COATED ORAL NIGHTLY
Status: DISCONTINUED | OUTPATIENT
Start: 2018-03-04 | End: 2018-03-05

## 2018-03-03 NOTE — PROGRESS NOTES
[facility]    Progress Note    Alfred Mckee Patient Status:  Observation    1965 MRN [id number]   Location [facility] 4W/SW/SE Attending Jeevan Keating MD   Hosp Day # 2 PCP Master Tang MD     Subjective:     Cons affect. Her behavior is normal.       Assessment and Plan:     Closed displaced fracture of lateral malleolus of right fibula  S/p ORIF/pain control, transition to p.o. meds. Continue DVT prophylaxis.   Physical therapy recommending short-term rehab stay

## 2018-03-03 NOTE — PROGRESS NOTES
[facility] - [facility]    Progress Note    Katya Morfin Patient Status:  Inpatient    1965 MRN [id number]   Location [facility] 4W/SW/SE Attending Steve Koehler MD   Hosp Day # 3 PCP Nathalie Huertas MD     Subjective:   Kavitha Gold

## 2018-03-03 NOTE — CONSULTS
[facility]    Report of Consultation    Jimenez Gorman Patient Status:  Inpatient    1965 MRN [id number]   Location [facility] 4W/SW/SE Attending Samreen Contreras MD   Hosp Day # 2 PCP Yanci Valdovinos MD     Date of Admis indicated that she have tendency to have racing thought and difficulty sleeping at night. Patient has been cooperative and treatment plan in the hospital and not exhibiting any concerning behavior.           Medical History:       Past Medical History  Pas PRN   acetaminophen (TYLENOL) tab 650 mg 650 mg Oral Q4H PRN   ondansetron HCl (ZOFRAN) injection 4 mg 4 mg Intravenous Q6H PRN   Citalopram Hydrobromide (CeleXA) tab 40 mg 40 mg Oral Nightly   Montelukast Sodium (SINGULAIR) tab 10 mg 10 mg Oral Nightly The patient is alert and oriented ×3. Stated age female who dressed casually laying down in bed with a cast on her right foot. Patient presented hypertalkative with loud speech with circumstantial thought process.   The patient reported that she has b

## 2018-03-03 NOTE — PHYSICAL THERAPY NOTE
PHYSICAL THERAPY TREATMENT NOTE - INPATIENT     Room Number: 432/432-A       Presenting Problem: s/p ORIF distal fibular fx    Problem List  Active Problems:    Closed displaced fracture of lateral malleolus of right fibula    Bimalleolar ankle fracture, r (elevation)    BALANCE                                                                                                                     Static Sitting: Normal  Dynamic Sitting: Normal           Static Standing: Fair  Dynamic Standing: Fair -          AM ambulate 50 feet with assist device: walker - rolling at assistance level: modified independent   Goal #3   Current Status Amb 75', 100' x 1 with rw, sba, nwb on rt le   Goal #4 Patient to demonstrate independence with home activity/exercise instructions p

## 2018-03-03 NOTE — PHYSICAL THERAPY NOTE
Attempted to see pt this AM. Stated that she just woke up and requested to be seen later. Will follow later today. Nursing is aware.

## 2018-03-03 NOTE — PROGRESS NOTES
ORTHO SURG ( COVERING ):  PO#3. AFEB. NO COMPLAINTS. PE: UP IN RECLINER, ALERT, NAD, RIGHT ANKLE  DRESSINGS ARE DRY, NO SWELLING OF RIGHT FOREFOOT, FULL AROM OF LEFT TOES IN FLEXION AND EXTENSION. ASSESS: SATISFACTORY PO#3.   D/C TO SNF  IS DELAYED DUE TO

## 2018-03-03 NOTE — CM/SW NOTE
KAELYN faxed PAS screen packet to [facility]. PAS screeners have 3 business days to complete PAS screen. KAELYN left Tereza/DEE a voicemail regarding fax.      PAS contact:  Phone: [phone]  Fax: [address], [address]

## 2018-03-04 PROCEDURE — 99231 SBSQ HOSP IP/OBS SF/LOW 25: CPT | Performed by: INTERNAL MEDICINE

## 2018-03-04 NOTE — PLAN OF CARE
DISCHARGE PLANNING    • Discharge to home or other facility with appropriate resources Progressing    Needs PAS screen for rehab      MUSCULOSKELETAL - ADULT    • Return mobility to safest level of function Progressing    • Maintain proper alignment of aff

## 2018-03-04 NOTE — PROGRESS NOTES
ORTHO SURG: PO#4  AFEB. NO COMPLAINTS. PE: ALERT, NAD, RIGHT SHORT LEG DRESSINGS DRY, AROM RIGHT TOES INTACT. ASSESS: SATISFACTORY PO#4. PLAN: D/C TO SNF WHEN ALL PENDING CLEARANCE / AUTHORIZATION IS COMPLETE.

## 2018-03-04 NOTE — PROGRESS NOTES
[facility]    Progress Note    Clelia Dakins Patient Status:  Inpatient    1965 MRN [id number]   Location [facility] 4W/SW/SE Attending Nuria Eric MD   Hosp Day # 4 PCP Blaze Mendez MD     Subjective:   Roxie Valentino

## 2018-03-04 NOTE — PHYSICAL THERAPY NOTE
PHYSICAL THERAPY TREATMENT NOTE - INPATIENT     Room Number: 424/424-A       Presenting Problem: s/p ORIF distal fibular fx    Problem List  Active Problems:    Closed displaced fracture of lateral malleolus of right fibula    Bimalleolar ankle fracture, r blankets)?: A Little   -   Sitting down on and standing up from a chair with arms (e.g., wheelchair, bedside commode, etc.): A Little   -   Moving from lying on back to sitting on the side of the bed?: A Little   How much help from another person does the

## 2018-03-05 ENCOUNTER — SNF VISIT (OUTPATIENT)
Dept: INTERNAL MEDICINE CLINIC | Facility: SKILLED NURSING FACILITY | Age: 53
End: 2018-03-05

## 2018-03-05 VITALS
WEIGHT: 148 LBS | DIASTOLIC BLOOD PRESSURE: 80 MMHG | BODY MASS INDEX: 27.23 KG/M2 | SYSTOLIC BLOOD PRESSURE: 140 MMHG | HEART RATE: 90 BPM | HEIGHT: 62 IN | TEMPERATURE: 98 F | OXYGEN SATURATION: 96 % | RESPIRATION RATE: 18 BRPM

## 2018-03-05 DIAGNOSIS — S82.891D CLOSED FRACTURE OF RIGHT ANKLE WITH ROUTINE HEALING, SUBSEQUENT ENCOUNTER: ICD-10-CM

## 2018-03-05 DIAGNOSIS — F31.11 BIPOLAR I DISORDER, MOST RECENT EPISODE (OR CURRENT) MANIC, MILD (HCC): ICD-10-CM

## 2018-03-05 DIAGNOSIS — J44.9 CHRONIC OBSTRUCTIVE PULMONARY DISEASE, UNSPECIFIED COPD TYPE (HCC): ICD-10-CM

## 2018-03-05 DIAGNOSIS — R53.1 WEAKNESS: ICD-10-CM

## 2018-03-05 PROCEDURE — 99310 SBSQ NF CARE HIGH MDM 45: CPT | Performed by: NURSE PRACTITIONER

## 2018-03-05 RX ORDER — HYDROCODONE BITARTRATE AND ACETAMINOPHEN 10; 325 MG/1; MG/1
2 TABLET ORAL EVERY 6 HOURS PRN
Status: DISCONTINUED | OUTPATIENT
Start: 2018-03-05 | End: 2018-03-05

## 2018-03-05 RX ORDER — FUROSEMIDE 40 MG/5ML
40 SOLUTION ORAL ONCE
Status: DISCONTINUED | OUTPATIENT
Start: 2018-03-05 | End: 2018-03-05

## 2018-03-05 RX ORDER — ZOLPIDEM TARTRATE 10 MG/1
10 TABLET ORAL NIGHTLY PRN
Status: DISCONTINUED | OUTPATIENT
Start: 2018-03-05 | End: 2018-03-05

## 2018-03-05 RX ORDER — 0.9 % SODIUM CHLORIDE 0.9 %
VIAL (ML) INJECTION
Status: COMPLETED
Start: 2018-03-05 | End: 2018-03-05

## 2018-03-05 RX ORDER — POLYETHYLENE GLYCOL 3350 17 G/17G
17 POWDER, FOR SOLUTION ORAL DAILY PRN
Qty: 1 EACH | Refills: 0 | Status: SHIPPED | OUTPATIENT
Start: 2018-03-05 | End: 2018-05-26

## 2018-03-05 RX ORDER — FUROSEMIDE 10 MG/ML
20 INJECTION INTRAMUSCULAR; INTRAVENOUS ONCE
Status: DISCONTINUED | OUTPATIENT
Start: 2018-03-05 | End: 2018-03-05

## 2018-03-05 RX ORDER — ZOLPIDEM TARTRATE 10 MG/1
10 TABLET ORAL NIGHTLY PRN
Qty: 30 TABLET | Refills: 0 | Status: SHIPPED | OUTPATIENT
Start: 2018-03-05

## 2018-03-05 RX ORDER — HYDROCODONE BITARTRATE AND ACETAMINOPHEN 10; 325 MG/1; MG/1
1 TABLET ORAL EVERY 6 HOURS PRN
Qty: 30 TABLET | Refills: 0 | Status: SHIPPED | OUTPATIENT
Start: 2018-03-05 | End: 2018-05-26

## 2018-03-05 RX ORDER — PSEUDOEPHEDRINE HCL 30 MG
100 TABLET ORAL 2 TIMES DAILY
Qty: 30 CAPSULE | Refills: 0 | Status: SHIPPED | OUTPATIENT
Start: 2018-03-05 | End: 2018-05-26

## 2018-03-05 RX ORDER — ALPRAZOLAM 0.5 MG/1
0.5 TABLET ORAL EVERY 6 HOURS PRN
Qty: 30 TABLET | Refills: 0 | Status: SHIPPED | OUTPATIENT
Start: 2018-03-05 | End: 2018-03-20 | Stop reason: DRUGHIGH

## 2018-03-05 RX ORDER — HYDROCODONE BITARTRATE AND ACETAMINOPHEN 10; 325 MG/1; MG/1
1 TABLET ORAL EVERY 6 HOURS PRN
Status: DISCONTINUED | OUTPATIENT
Start: 2018-03-05 | End: 2018-03-05

## 2018-03-05 RX ORDER — HYDROCODONE BITARTRATE AND ACETAMINOPHEN 10; 325 MG/1; MG/1
2 TABLET ORAL EVERY 6 HOURS PRN
Qty: 30 TABLET | Refills: 0 | Status: SHIPPED | OUTPATIENT
Start: 2018-03-05 | End: 2018-05-26

## 2018-03-05 RX ORDER — FUROSEMIDE 40 MG/1
40 TABLET ORAL ONCE
Status: COMPLETED | OUTPATIENT
Start: 2018-03-05 | End: 2018-03-05

## 2018-03-05 NOTE — CM/SW NOTE
The pt. Is scheduled to discharge to [facility] today 3/5 at 4p, via [address]. The pt. Is aware of discharge and her friend will be providing transport.     PAS screen has been completed    Report [facility], [address] [place] [address]

## 2018-03-05 NOTE — PROGRESS NOTES
HPI: Christina Cabezas  is a 45 yo female who was admitted to [facility] with a fracture of the lateral malleolus of her right fibula after slipping on ice and falling. Dr. Adelso Adams from orthopaedics was consulted and the patient underwent ORIF.  During her hosp follows commands  PSYCHIATRIC: alert and oriented x 3; affect appropriate      ASSESSMENT/PLAN  PT/OT eval  Schedule f/u with Dr. Joaquina Root as directed, per RN wound dressing is not to be changed in rehab   Labs tomorrow cbc cmp       1.  Closed displaced fr

## 2018-03-05 NOTE — PHYSICAL THERAPY NOTE
PHYSICAL THERAPY TREATMENT NOTE - INPATIENT     Room Number: 424/424-A       Presenting Problem: s/p ORIF distal fibular fx    Problem List  Active Problems:    Closed displaced fracture of lateral malleolus of right fibula    Bimalleolar ankle fracture, r from a chair with arms (e.g., wheelchair, bedside commode, etc.): A Little   -   Moving from lying on back to sitting on the side of the bed?: A Little   How much help from another person does the patient currently need. ..   -   Moving to and from a bed to

## 2018-03-05 NOTE — DISCHARGE SUMMARY
[facility]  Discharge Summary    Clelia Dakins Patient Status:  Inpatient    1965 MRN [id number]   Location [facility] 4W/SW/SE Attending Nuria Eric MD   Hosp Day # 5 PCP Blaze Mendez MD     Date of Admission: 2018 facility    Discharge Condition: Good    Discharge Medications: Current Discharge Medication List    START taking these medications    !! HYDROcodone-acetaminophen  MG Oral Tab  Take 1 tablet by mouth every 6 (six) hours as needed.   Qty: 30 tablet Re

## 2018-03-05 NOTE — PROGRESS NOTES
The patient seen for 25 minute, follow-up evaluation, over 50% consulting and managing treatment plan.       History of Present Illness:   Patient is a 46year old   female with a chronic history of bipolar disorder and asthma who presented recommend the following approach:      1. Patient appropriate for transfer to skilled nursing facility for rehab. 2.  Focus on education and support. 3.  Psychotherapy focusing on insight orientation and restructuring the negative thought.   4.  Continue

## 2018-03-05 NOTE — PROGRESS NOTES
[facility]    Progress Note    Whitney Stephens Patient Status:  Inpatient    1965 MRN [id number]   Location [facility] 4W/SW/SE Attending Mitchel Najera MD   Hosp Day # 5 PCP Jenelle Knox MD     Date of Admission:   tablet 1 tablet Oral PRN   Or      [COMPLETED] HYDROcodone-acetaminophen (NORCO) 5-325 MG per tab 2 tablet 2 tablet Oral PRN   Normal Saline Flush 0.9 % injection 10 mL 10 mL Intravenous PRN   docusate sodium (COLACE) cap 100 mg 100 mg Oral BID   PEG 3350 Smokeless tobacco: Never Used                      Alcohol use:  No               Comment: socially       PHYSICAL EXAM:   /70 (BP Location: Right arm)   Pulse 73   Temp 98 °F (36.7 °C) (Oral)   Resp 18   Ht 62\"   Wt 148 lb   SpO2 97%   BMI 27.07 k produce this note. The note was proofread for content only. Typographical errors may remain.       Amanda Peña MD  3/5/2018

## 2018-03-07 ENCOUNTER — SNF VISIT (OUTPATIENT)
Dept: INTERNAL MEDICINE CLINIC | Facility: SKILLED NURSING FACILITY | Age: 53
End: 2018-03-07

## 2018-03-07 DIAGNOSIS — S82.61XD CLOSED DISPLACED FRACTURE OF LATERAL MALLEOLUS OF RIGHT FIBULA WITH ROUTINE HEALING, SUBSEQUENT ENCOUNTER: ICD-10-CM

## 2018-03-07 DIAGNOSIS — R53.1 WEAKNESS: ICD-10-CM

## 2018-03-07 DIAGNOSIS — D64.9 ANEMIA, UNSPECIFIED TYPE: ICD-10-CM

## 2018-03-07 PROCEDURE — 99307 SBSQ NF CARE SF MDM 10: CPT | Performed by: NURSE PRACTITIONER

## 2018-03-07 NOTE — PROGRESS NOTES
HPI: Christina Cabezas  is a 45 yo female who was admitted to [address] [place] [address] with a fracture of the lateral malleolus of her right fibula after slipping on ice and falling. Dr. Adelso Adams from orthopaedics was consulted and the patient underwent ORIF.  During her hosp Dr. Vadim Broussard tomorrow   Cbc cmp 3/6 stable       1.  Closed displaced fracture of lateral malleolus of right fibula  S/p ORIF  PT/OT, NWB RLE, f/u Dr. Vadim Broussard 3/8 as directed, xarelto 10mg po qd for dvt prophylaxis  norco 10/325mg po 1-2 tabs q6 prn   Phyllis

## 2018-03-15 PROBLEM — S82.841A CLOSED BIMALLEOLAR FRACTURE OF RIGHT ANKLE: Status: ACTIVE | Noted: 2018-02-28

## 2018-06-02 ENCOUNTER — LAB ENCOUNTER (OUTPATIENT)
Dept: LAB | Facility: HOSPITAL | Age: 53
End: 2018-06-02
Attending: ORTHOPAEDIC SURGERY
Payer: MEDICARE

## 2018-06-02 DIAGNOSIS — S82.841D CLOSED BIMALLEOLAR FRACTURE OF RIGHT ANKLE WITH ROUTINE HEALING, SUBSEQUENT ENCOUNTER: ICD-10-CM

## 2018-06-02 DIAGNOSIS — Z01.818 PREOP TESTING: ICD-10-CM

## 2018-06-02 PROCEDURE — 36415 COLL VENOUS BLD VENIPUNCTURE: CPT

## 2018-06-02 PROCEDURE — 85025 COMPLETE CBC W/AUTO DIFF WBC: CPT

## 2018-06-02 PROCEDURE — 81003 URINALYSIS AUTO W/O SCOPE: CPT

## 2018-06-06 ENCOUNTER — APPOINTMENT (OUTPATIENT)
Dept: GENERAL RADIOLOGY | Facility: HOSPITAL | Age: 53
End: 2018-06-06
Attending: ORTHOPAEDIC SURGERY
Payer: MEDICARE

## 2018-06-06 ENCOUNTER — HOSPITAL ENCOUNTER (OUTPATIENT)
Facility: HOSPITAL | Age: 53
Setting detail: HOSPITAL OUTPATIENT SURGERY
Discharge: HOME | End: 2018-06-06
Attending: ORTHOPAEDIC SURGERY | Admitting: ORTHOPAEDIC SURGERY
Payer: MEDICARE

## 2018-06-06 ENCOUNTER — SURGERY (OUTPATIENT)
Age: 53
End: 2018-06-06

## 2018-06-06 ENCOUNTER — ANESTHESIA (OUTPATIENT)
Dept: SURGERY | Facility: HOSPITAL | Age: 53
End: 2018-06-06
Payer: MEDICARE

## 2018-06-06 ENCOUNTER — ANESTHESIA EVENT (OUTPATIENT)
Dept: SURGERY | Facility: HOSPITAL | Age: 53
End: 2018-06-06
Payer: MEDICARE

## 2018-06-06 VITALS
HEART RATE: 87 BPM | SYSTOLIC BLOOD PRESSURE: 149 MMHG | TEMPERATURE: 98 F | BODY MASS INDEX: 26.5 KG/M2 | RESPIRATION RATE: 20 BRPM | HEIGHT: 62 IN | WEIGHT: 144 LBS | OXYGEN SATURATION: 100 % | DIASTOLIC BLOOD PRESSURE: 88 MMHG

## 2018-06-06 PROCEDURE — 73610 X-RAY EXAM OF ANKLE: CPT | Performed by: ORTHOPAEDIC SURGERY

## 2018-06-06 PROCEDURE — 88300 SURGICAL PATH GROSS: CPT | Performed by: ORTHOPAEDIC SURGERY

## 2018-06-06 PROCEDURE — 76001 XR C-ARM FLUORO >1 HOUR  (CPT=76001): CPT | Performed by: ORTHOPAEDIC SURGERY

## 2018-06-06 PROCEDURE — 0SPF04Z REMOVAL OF INTERNAL FIXATION DEVICE FROM RIGHT ANKLE JOINT, OPEN APPROACH: ICD-10-PCS | Performed by: ORTHOPAEDIC SURGERY

## 2018-06-06 RX ORDER — HYDROCODONE BITARTRATE AND ACETAMINOPHEN 5; 325 MG/1; MG/1
2 TABLET ORAL AS NEEDED
Status: DISCONTINUED | OUTPATIENT
Start: 2018-06-06 | End: 2018-06-06

## 2018-06-06 RX ORDER — ACETAMINOPHEN 500 MG
1000 TABLET ORAL ONCE
Status: COMPLETED | OUTPATIENT
Start: 2018-06-06 | End: 2018-06-06

## 2018-06-06 RX ORDER — NALOXONE HYDROCHLORIDE 0.4 MG/ML
80 INJECTION, SOLUTION INTRAMUSCULAR; INTRAVENOUS; SUBCUTANEOUS AS NEEDED
Status: DISCONTINUED | OUTPATIENT
Start: 2018-06-06 | End: 2018-06-06

## 2018-06-06 RX ORDER — HYDROCODONE BITARTRATE AND ACETAMINOPHEN 5; 325 MG/1; MG/1
1 TABLET ORAL EVERY 4 HOURS PRN
Status: CANCELLED | OUTPATIENT
Start: 2018-06-06

## 2018-06-06 RX ORDER — ACETAMINOPHEN 325 MG/1
650 TABLET ORAL EVERY 4 HOURS PRN
Status: CANCELLED | OUTPATIENT
Start: 2018-06-06

## 2018-06-06 RX ORDER — LIDOCAINE HYDROCHLORIDE 10 MG/ML
INJECTION, SOLUTION EPIDURAL; INFILTRATION; INTRACAUDAL; PERINEURAL AS NEEDED
Status: DISCONTINUED | OUTPATIENT
Start: 2018-06-06 | End: 2018-06-06 | Stop reason: SURG

## 2018-06-06 RX ORDER — HALOPERIDOL 5 MG/ML
0.25 INJECTION INTRAMUSCULAR ONCE AS NEEDED
Status: DISCONTINUED | OUTPATIENT
Start: 2018-06-06 | End: 2018-06-06

## 2018-06-06 RX ORDER — ONDANSETRON 2 MG/ML
4 INJECTION INTRAMUSCULAR; INTRAVENOUS EVERY 6 HOURS PRN
Status: CANCELLED | OUTPATIENT
Start: 2018-06-06

## 2018-06-06 RX ORDER — HYDROCODONE BITARTRATE AND ACETAMINOPHEN 5; 325 MG/1; MG/1
1 TABLET ORAL AS NEEDED
Status: DISCONTINUED | OUTPATIENT
Start: 2018-06-06 | End: 2018-06-06

## 2018-06-06 RX ORDER — ONDANSETRON 2 MG/ML
INJECTION INTRAMUSCULAR; INTRAVENOUS AS NEEDED
Status: DISCONTINUED | OUTPATIENT
Start: 2018-06-06 | End: 2018-06-06 | Stop reason: SURG

## 2018-06-06 RX ORDER — BUPIVACAINE HYDROCHLORIDE AND EPINEPHRINE 2.5; 5 MG/ML; UG/ML
INJECTION, SOLUTION INFILTRATION; PERINEURAL AS NEEDED
Status: DISCONTINUED | OUTPATIENT
Start: 2018-06-06 | End: 2018-06-06 | Stop reason: HOSPADM

## 2018-06-06 RX ORDER — CEFAZOLIN SODIUM/WATER 2 G/20 ML
2 SYRINGE (ML) INTRAVENOUS ONCE
Status: COMPLETED | OUTPATIENT
Start: 2018-06-06 | End: 2018-06-06

## 2018-06-06 RX ORDER — GLYCOPYRROLATE 0.2 MG/ML
INJECTION, SOLUTION INTRAMUSCULAR; INTRAVENOUS AS NEEDED
Status: DISCONTINUED | OUTPATIENT
Start: 2018-06-06 | End: 2018-06-06 | Stop reason: SURG

## 2018-06-06 RX ORDER — HYDROMORPHONE HYDROCHLORIDE 1 MG/ML
0.4 INJECTION, SOLUTION INTRAMUSCULAR; INTRAVENOUS; SUBCUTANEOUS EVERY 5 MIN PRN
Status: DISCONTINUED | OUTPATIENT
Start: 2018-06-06 | End: 2018-06-06

## 2018-06-06 RX ORDER — METOCLOPRAMIDE 10 MG/1
10 TABLET ORAL ONCE
Status: DISCONTINUED | OUTPATIENT
Start: 2018-06-06 | End: 2018-06-06 | Stop reason: HOSPADM

## 2018-06-06 RX ORDER — HYDROMORPHONE HYDROCHLORIDE 1 MG/ML
0.6 INJECTION, SOLUTION INTRAMUSCULAR; INTRAVENOUS; SUBCUTANEOUS EVERY 5 MIN PRN
Status: DISCONTINUED | OUTPATIENT
Start: 2018-06-06 | End: 2018-06-06

## 2018-06-06 RX ORDER — HYDROCODONE BITARTRATE AND ACETAMINOPHEN 7.5; 325 MG/1; MG/1
1 TABLET ORAL EVERY 6 HOURS PRN
Qty: 30 TABLET | Refills: 0 | Status: SHIPPED | OUTPATIENT
Start: 2018-06-06

## 2018-06-06 RX ORDER — HYDROMORPHONE HYDROCHLORIDE 1 MG/ML
0.2 INJECTION, SOLUTION INTRAMUSCULAR; INTRAVENOUS; SUBCUTANEOUS EVERY 5 MIN PRN
Status: DISCONTINUED | OUTPATIENT
Start: 2018-06-06 | End: 2018-06-06

## 2018-06-06 RX ORDER — DEXAMETHASONE SODIUM PHOSPHATE 4 MG/ML
VIAL (ML) INJECTION AS NEEDED
Status: DISCONTINUED | OUTPATIENT
Start: 2018-06-06 | End: 2018-06-06 | Stop reason: SURG

## 2018-06-06 RX ORDER — ONDANSETRON 2 MG/ML
4 INJECTION INTRAMUSCULAR; INTRAVENOUS ONCE AS NEEDED
Status: COMPLETED | OUTPATIENT
Start: 2018-06-06 | End: 2018-06-06

## 2018-06-06 RX ORDER — FAMOTIDINE 20 MG/1
20 TABLET ORAL ONCE
Status: DISCONTINUED | OUTPATIENT
Start: 2018-06-06 | End: 2018-06-06 | Stop reason: HOSPADM

## 2018-06-06 RX ORDER — MIDAZOLAM HYDROCHLORIDE 1 MG/ML
INJECTION INTRAMUSCULAR; INTRAVENOUS AS NEEDED
Status: DISCONTINUED | OUTPATIENT
Start: 2018-06-06 | End: 2018-06-06 | Stop reason: SURG

## 2018-06-06 RX ORDER — SODIUM CHLORIDE, SODIUM LACTATE, POTASSIUM CHLORIDE, CALCIUM CHLORIDE 600; 310; 30; 20 MG/100ML; MG/100ML; MG/100ML; MG/100ML
INJECTION, SOLUTION INTRAVENOUS CONTINUOUS
Status: DISCONTINUED | OUTPATIENT
Start: 2018-06-06 | End: 2018-06-06

## 2018-06-06 RX ORDER — HYDROCODONE BITARTRATE AND ACETAMINOPHEN 5; 325 MG/1; MG/1
2 TABLET ORAL EVERY 4 HOURS PRN
Status: CANCELLED | OUTPATIENT
Start: 2018-06-06

## 2018-06-06 RX ADMIN — GLYCOPYRROLATE 0.2 MG: 0.2 INJECTION, SOLUTION INTRAMUSCULAR; INTRAVENOUS at 07:46:00

## 2018-06-06 RX ADMIN — CEFAZOLIN SODIUM/WATER 2 G: 2 G/20 ML SYRINGE (ML) INTRAVENOUS at 08:01:00

## 2018-06-06 RX ADMIN — DEXAMETHASONE SODIUM PHOSPHATE 4 MG: 4 MG/ML VIAL (ML) INJECTION at 07:45:00

## 2018-06-06 RX ADMIN — ONDANSETRON 4 MG: 2 INJECTION INTRAMUSCULAR; INTRAVENOUS at 07:45:00

## 2018-06-06 RX ADMIN — MIDAZOLAM HYDROCHLORIDE 2 MG: 1 INJECTION INTRAMUSCULAR; INTRAVENOUS at 07:46:00

## 2018-06-06 RX ADMIN — LIDOCAINE HYDROCHLORIDE 50 MG: 10 INJECTION, SOLUTION EPIDURAL; INFILTRATION; INTRACAUDAL; PERINEURAL at 07:45:00

## 2018-06-06 RX ADMIN — SODIUM CHLORIDE, SODIUM LACTATE, POTASSIUM CHLORIDE, CALCIUM CHLORIDE: 600; 310; 30; 20 INJECTION, SOLUTION INTRAVENOUS at 09:03:00

## 2018-06-06 RX ADMIN — SODIUM CHLORIDE, SODIUM LACTATE, POTASSIUM CHLORIDE, CALCIUM CHLORIDE: 600; 310; 30; 20 INJECTION, SOLUTION INTRAVENOUS at 07:40:00

## 2018-06-06 NOTE — ANESTHESIA POSTPROCEDURE EVALUATION
Patient: Jimenez Gorman    Procedure Summary     Date:  06/06/18 Room / Location:  [address] [place] [address] MAIN OR 12 / [address] [place] [address] MAIN OR    Anesthesia Start:  3727 Anesthesia Stop:      Procedure:  EXTREMITY LOWER HARDWARE REMOVAL (Right Ankle) Diagnosis:  (bimalleolar ankle f

## 2018-06-06 NOTE — H&P
[facility] HOSP - [facility]    History and [address] [facility] Patient Status:  Hospital Outpatient Surgery    1965 MRN [id number]   [place] [address] Attending Shukri Hutton MD   Hosp Day # 0 PCP Martha Cuenca Negative. Respiratory: Negative. Cardiovascular: Negative. Gastrointestinal: Negative. Psychiatric/Behavioral: Negative.         Physical Exam:   Vital Signs:  Blood pressure 136/76, pulse 82, temperature 97.9 °F (36.6 °C), temperature source Or

## 2018-06-06 NOTE — INTERVAL H&P NOTE
Pre-op Diagnosis: bimalleolar ankle fracture, retained hardware    The above referenced H&P was reviewed by Natasha Sinclair MD on 6/6/2018, the patient was examined and no significant changes have occurred in the patient's condition since the H&P was per

## 2018-06-06 NOTE — H&P
Progress Notes  Encounter Date: 5/10/2018  Kermit Iyer MD   SURGERY, ORTHOPEDIC      []Manual[]Template  []Copied  HPI:   Munira Dickens: Ms. Florencia Tesfaye is status post open reduction internal fixation of a right ankle bimalleolar fracture Feb Albuterol (VENTOLIN IN) Inhale into the lungs. Disp:  Rfl:    Fluticasone-Salmeterol (ADVAIR DISKUS IN) Inhale into the lungs. Disp:  Rfl:    Albuterol Sulfate HFA (VENTOLIN) 108 (90 BASE) MCG/ACT Inhalation Aero Soln Inhale  into the lungs.  Indications: A IMAGING: X-RAYS: RIGHT ANKLE AP, LATERAL, OBLIQUE: Multiple views again demonstrate a right ankle supination–external rotation type IV fracture stabilized with a locked fibular plate and syndesmotic screw.  No change in position/alignment is appreciated.    The patient was told if they understood the above discussion and felt the potential benefits outweighed the potential risks and was deemed medically stable, we will consider proceeding accordingly.                  Digital transcription software was [employer]

## 2018-06-06 NOTE — BRIEF OP NOTE
[facility] 44   Brief Op Note    Patients Name: Ana Laura Abraham  Attending Physician: Julian Cee MD  Operating Physician: Hunter Kate MD  CSN: [id number]     Location:  OR  MRN: [id number]    YOB: 1965  Admiss

## 2018-06-06 NOTE — ANESTHESIA PREPROCEDURE EVALUATION
Anesthesia PreOp Note    HPI:     Abida Shrestha is a 46year old female who presents for preoperative consultation requested by: Anitha Oviedo MD    Date of Surgery: 6/6/2018    Procedure(s):  EXTREMITY LOWER HARDWARE REMOVAL  Indication: bimalle Hydrobromide (CELEXA) 40 MG Oral Tab Take 40 mg by mouth nightly. Disp:  Rfl:  6/5/2018 at Unknown time   Zolpidem Tartrate 10 MG Oral Tab Take 1 tablet (10 mg total) by mouth nightly as needed for Sleep.  Disp: 30 tablet Rfl: 0 Taking       Current Facil kg (144 lb)   Height: 1.575 m (5' 2\") 1.575 m (5' 2\")        Anesthesia ROS/Med Hx and Physical Exam     Patient summary reviewed and Nursing notes reviewed    Airway   Mallampati: II  TM distance: >3 FB  Neck ROM: full  Dental - normal exam     Pulmonar

## 2018-06-06 NOTE — OPERATIVE REPORT
[facility]    PATIENT'S NAME: Mandi Marilynn   ATTENDING PHYSICIAN: Mo Brush MD   OPERATING PHYSICIAN: Hayde Up MD   PATIENT ACCOUNT#:   [de-identified]    LOCATION:  [place] [address]  MEDICAL RECORD #:   [id number]       D The patient seemed to tolerate the procedure well, having suffered no apparent untoward effects from the anesthetic agent or the procedure itself. The needle and sponge counts were correct.     Dictated By Julianna Garcia MD  d: 06/06/2018 10:06:59  t

## 2021-07-12 ENCOUNTER — HOSPITAL ENCOUNTER (EMERGENCY)
Facility: HOSPITAL | Age: 56
Discharge: HOME | End: 2021-07-12
Attending: EMERGENCY MEDICINE
Payer: MEDICARE

## 2021-07-12 VITALS
BODY MASS INDEX: 28.05 KG/M2 | OXYGEN SATURATION: 97 % | HEART RATE: 101 BPM | SYSTOLIC BLOOD PRESSURE: 141 MMHG | WEIGHT: 148.56 LBS | RESPIRATION RATE: 18 BRPM | HEIGHT: 61 IN | TEMPERATURE: 98 F | DIASTOLIC BLOOD PRESSURE: 58 MMHG

## 2021-07-12 DIAGNOSIS — R20.2 PARESTHESIAS: Primary | ICD-10-CM

## 2021-07-12 PROCEDURE — 99282 EMERGENCY DEPT VISIT SF MDM: CPT

## 2021-07-12 NOTE — CM/SW NOTE
Patient needs help getting home. She can walk, but has no money and no one to pick her up. Patient given a one time [employer].

## 2021-07-12 NOTE — ED INITIAL ASSESSMENT (HPI)
Patient with fall on Thursday; seen and given right shoulder immobilizer. Feeling numbness/tingling in right hand since Friday. +radial pulse ro right arm. Able to move hand/fingers.

## 2021-07-12 NOTE — ED QUICK NOTES
Pt educated on d/c instructions, answered all questions, pt verbalized understanding. Pt VSS, ambulatory at this of d/c.

## 2021-07-13 NOTE — ED PROVIDER NOTES
Patient Seen in: [facility] AND [facility] Emergency Department    History   Patient presents with:  Arm or Hand Injury  Numbness Weakness      HPI    Patient presents to the ED complaining of tingling and numbness in her right fourth and fifth fingers.   She sta negative. Constitutional and vital signs reviewed. Social History and Family History elements reviewed from today, pertinent positives to the presenting problem noted.     Physical Exam     ED Triage Vitals [07/12/21 0113]   BP (!) 179/119   Pulse 1 Available and Reviewed while in ED: No results found.   ED Medications Administered: Medications - No data to display      [facility]      07/12/21  0113 07/12/21  0130 07/12/21  0145   BP: (!) 179/119 (!) 142/97 141/58   Pulse: 118 95 101   Resp: 21 16 18   Temp:

## 2022-01-06 ENCOUNTER — HOSPITAL ENCOUNTER (EMERGENCY)
Facility: HOSPITAL | Age: 57
Discharge: HOME | End: 2022-01-06
Payer: MEDICARE

## 2022-01-06 ENCOUNTER — APPOINTMENT (OUTPATIENT)
Dept: GENERAL RADIOLOGY | Facility: HOSPITAL | Age: 57
End: 2022-01-06
Payer: MEDICARE

## 2022-01-06 VITALS
RESPIRATION RATE: 20 BRPM | BODY MASS INDEX: 27.05 KG/M2 | WEIGHT: 147 LBS | SYSTOLIC BLOOD PRESSURE: 156 MMHG | DIASTOLIC BLOOD PRESSURE: 99 MMHG | OXYGEN SATURATION: 92 % | TEMPERATURE: 98 F | HEIGHT: 62 IN | HEART RATE: 98 BPM

## 2022-01-06 DIAGNOSIS — S52.531A CLOSED COLLES' FRACTURE OF RIGHT RADIUS, INITIAL ENCOUNTER: Primary | ICD-10-CM

## 2022-01-06 PROCEDURE — 73090 X-RAY EXAM OF FOREARM: CPT

## 2022-01-06 PROCEDURE — 99284 EMERGENCY DEPT VISIT MOD MDM: CPT

## 2022-01-06 PROCEDURE — 73110 X-RAY EXAM OF WRIST: CPT

## 2022-01-06 PROCEDURE — 29105 APPLICATION LONG ARM SPLINT: CPT

## 2022-01-06 RX ORDER — HYDROCODONE BITARTRATE AND ACETAMINOPHEN 5; 325 MG/1; MG/1
2 TABLET ORAL ONCE
Status: COMPLETED | OUTPATIENT
Start: 2022-01-06 | End: 2022-01-06

## 2022-01-06 RX ORDER — HYDROCODONE BITARTRATE AND ACETAMINOPHEN 5; 325 MG/1; MG/1
1 TABLET ORAL EVERY 6 HOURS PRN
Qty: 20 TABLET | Refills: 0 | Status: SHIPPED | OUTPATIENT
Start: 2022-01-06

## 2022-01-06 RX ORDER — IBUPROFEN 600 MG/1
600 TABLET ORAL EVERY 8 HOURS PRN
Qty: 15 TABLET | Refills: 0 | Status: SHIPPED | OUTPATIENT
Start: 2022-01-06 | End: 2022-01-11

## 2022-01-06 RX ORDER — HYDROCODONE BITARTRATE AND ACETAMINOPHEN 5; 325 MG/1; MG/1
1 TABLET ORAL EVERY 6 HOURS PRN
Qty: 15 TABLET | Refills: 0 | Status: SHIPPED | OUTPATIENT
Start: 2022-01-06 | End: 2022-01-06

## 2022-01-06 NOTE — ED PROVIDER NOTES
Patient Seen in: [facility] AND [facility] Emergency Department      History   Patient presents with:  Trauma    Stated Complaint: wrist injury from slip and fall    Subjective:   HPI    14-year-old female right-hand-dominant with a fall yesterday slipping on ic No data to display       XR FOREARM (2 VIEWS), RIGHT (CPT=73090)    Result Date: 1/6/2022  PROCEDURE: XR FOREARM (2 VIEWS), RIGHT (CPT=73090)  COMPARISON: None. INDICATIONS: Post fall on ice x 1 day.  Pain and swelling in right wrist  TECHNIQUE: 2 views we be seen by the on-call Ortho and not Dr. Carmen Loya preffered doctor. She was encouraged to elevate and use the pain medication and call for an appointment to return with worsening or change.   Explained to her she may need reduction after the swelling is imp

## 2022-01-06 NOTE — ED INITIAL ASSESSMENT (HPI)
Pt c/o slipping on ice at home and landing on right wrist at 11p. Pt has sweeling of right wrist and bruising to top of hand. Pt has strong pulses and is able to move all digits.

## (undated) DEVICE — TETRA-FLEX CF WOVEN LATEX FREE ELASTIC BANDAGE 4" X 5.5 YD: Brand: TETRA-FLEX™CF

## (undated) DEVICE — COTTON UNDERCAST PADDING,REGULAR FINISH: Brand: WEBRIL

## (undated) DEVICE — DRAPE C-ARM UNIVERSAL

## (undated) DEVICE — GAUZE SPONGES,12 PLY: Brand: CURITY

## (undated) DEVICE — BATTERY

## (undated) DEVICE — SUTURE MONOCRYL 3-0 Y497G

## (undated) DEVICE — 9534HP TRANSPARENT DRSG W/FRAME: Brand: 3M™ TEGADERM™

## (undated) DEVICE — STERILE LATEX POWDER-FREE SURGICAL GLOVESWITH NITRILE COATING: Brand: PROTEXIS

## (undated) DEVICE — STERILE LATEX POWDER-FREE SURGICAL GLOVES WITH HYDROGEL COATING, SMOOTH FINISH, STRAIGHT FINGER: Brand: PROTEXIS

## (undated) DEVICE — BANDAGE ROLL,100% COTTON, 6 PLY, LARGE: Brand: KERLIX

## (undated) DEVICE — ESMARK: Brand: DEROYAL

## (undated) DEVICE — STERILE TETRA-FLEX CF, ELASTIC BANDAGE, 4" X 5.5YD: Brand: TETRA-FLEX™CF

## (undated) DEVICE — Device

## (undated) DEVICE — PADDING 4YDX6IN CTTN STRL WBRL

## (undated) DEVICE — 3M™ IOBAN™ 2 ANTIMICROBIAL INCISE DRAPE 6650EZ: Brand: IOBAN™ 2

## (undated) DEVICE — TRAY SRGPRP PVP IOD WT SCRB SM

## (undated) DEVICE — BANDAGE FLXMSTR 11YDX6IN STRL

## (undated) DEVICE — SUCTION CANISTER, 3000CC,SAFELINER: Brand: DEROYAL

## (undated) DEVICE — PROXIMATE SKIN STAPLERS (35 WIDE) CONTAINS 35 STAINLESS STEEL STAPLES (FIXED HEAD): Brand: PROXIMATE

## (undated) DEVICE — IMPLANTABLE DEVICE
Type: IMPLANTABLE DEVICE | Site: ANKLE | Status: NON-FUNCTIONAL
Removed: 2018-02-28

## (undated) DEVICE — OCCLUSIVE GAUZE STRIP OVERWRAP,3% BISMUTH TRIBROMOPHENATE IN PETROLATUM BLEND: Brand: XEROFORM

## (undated) DEVICE — SPLINT PRECUT SYNTH 5X30

## (undated) DEVICE — SOL  .9 1000ML BTL

## (undated) DEVICE — GOWN SURG AERO BLUE PERF LG

## (undated) DEVICE — 3M™ STERI-STRIP™ REINFORCED ADHESIVE SKIN CLOSURES, R1547, 1/2 IN X 4 IN (12 MM X 100 MM), 6 STRIPS/ENVELOPE: Brand: 3M™ STERI-STRIP™

## (undated) DEVICE — STERILE POLYISOPRENE POWDER-FREE SURGICAL GLOVES: Brand: PROTEXIS

## (undated) DEVICE — 3M™ STERI-STRIP™ COMPOUND BENZOIN TINCTURE 40 BAGS/CARTON 4 CARTONS/CASE C1544: Brand: 3M™ STERI-STRIP™

## (undated) DEVICE — TOWEL OR BLU 16X26 STRL

## (undated) DEVICE — SCREW CANC FT 4.0X18 206.018
Type: IMPLANTABLE DEVICE | Site: ANKLE | Status: NON-FUNCTIONAL
Removed: 2018-02-28

## (undated) DEVICE — COVER SGL STRL LGHT HNDL BLU

## (undated) DEVICE — ZIMMER® STERILE DISPOSABLE TOURNIQUET CUFF WITH PLC, DUAL PORT, SINGLE BLADDER, 30 IN. (76 CM)

## (undated) DEVICE — WIRE K SYNT 1.6 THR 292.71: Type: IMPLANTABLE DEVICE | Site: ANKLE

## (undated) DEVICE — DRAPE SHEET LG

## (undated) DEVICE — SUTURE ETHILON 4-0 662G

## (undated) DEVICE — TETRA-FLEX CF WOVEN LATEX FREE ELASTIC BANDAGE 6" X 5.5 YD: Brand: TETRA-FLEX™CF

## (undated) DEVICE — GAUZE SPONGES: Brand: DEROYAL

## (undated) DEVICE — GLV RAD SURG 8.5

## (undated) DEVICE — INTENDED FOR TISSUE SEPARATION, AND OTHER PROCEDURES THAT REQUIRE A SHARP SURGICAL BLADE TO PUNCTURE OR CUT.: Brand: BARD-PARKER ® STAINLESS STEEL BLADES

## (undated) DEVICE — OCCLUSIVE GAUZE STRIP,3% BISMUTH TRIBROMOPHENATE IN PETROLATUM BLEND: Brand: XEROFORM

## (undated) DEVICE — LOWER EXTREMITY: Brand: MEDLINE INDUSTRIES, INC.

## (undated) DEVICE — KIT DRN 1/8IN PVC 3 SPRG EVAC

## (undated) DEVICE — SOLUTION SURG DURA PREP HAZMAT

## (undated) DEVICE — C-ARMOR C-ARM EQUIPMENT COVERS CLEAR STERILE UNIVERSAL FIT 12 PER CASE: Brand: C-ARMOR

## (undated) DEVICE — SUTURE VICRYL 0 CP-1

## (undated) DEVICE — STERILE WATER

## (undated) DEVICE — REM POLYHESIVE ADULT PATIENT RETURN ELECTRODE: Brand: VALLEYLAB

## (undated) DEVICE — 3M™ COBAN™ NL STERILE NON-LATEX SELF-ADHERENT WRAP, 2084S, 4 IN X 5 YD (10 CM X 4,5 M), 18 ROLLS/CASE: Brand: 3M™ COBAN™

## (undated) NOTE — ED AVS SNAPSHOT
Jesse Reynoso   MRN: [id number]    Department:  [facility] Emergency Department   Date of Visit:  2/20/2018           Disclosure     Insurance plans vary and the physician(s) referred by the ER may not be covered by your plan.  Please conta CARE PHYSICIAN AT ONCE OR RETURN IMMEDIATELY TO THE EMERGENCY DEPARTMENT. If you have been prescribed any medication(s), please fill your prescription right away and begin taking the medication(s) as directed.   If you believe that any of the medications

## (undated) NOTE — IP AVS SNAPSHOT
[facility]            (For Outpatient Use Only) Initial Admit Date: 2/28/2018   Inpt/Obs Admit Date: Inpt: 2/28/18 / Obs: N/A   Discharge Date:    Mely Sevillaet:  [de-identified]   MRN: [de-identified]   CSN: [id number]        [facility] Hospital Account Financial Class: Medicare    March 5, 2018

## (undated) NOTE — LETTER
December 28, 2017    Patient: Jeri Guy   Date of Visit: 12/28/2017       To Whom It May Concern:    Kiki Vaughan was seen and treated in our emergency department on 12/28/2017. She should not return to work until 12/30/17.     If you have

## (undated) NOTE — IP AVS SNAPSHOT
Patient Demographics     Address  [address] [place] [address] [place] [address] Phone  [phone] (Home) *Preferred* E-mail Address  [email or web address]      Emergency Contact(s)     Name Relation Home Work Moody Sauceda [phone]        Lalito France · If you had laparoscopic surgery, to feel shoulder pain or discomfort on the day of surgery. · For some patients to have nausea after surgery/anesthesia    If you feel nausea or experience vomiting:   · Try to move around less.    · Eat less than usual o HYDROcodone-acetaminophen  MG Tabs  Commonly known as:  NORCO      Take 2 tablets by mouth every 6 (six) hours as needed.    Severo Child, MD         Montelukast Sodium 10 MG Tabs  Commonly known as:  SINGULAIR  Next dose due:  TONIGHT      Take 10 m Pulse  90 Filed at 03/05/2018 1334   Resp  18 Filed at 03/05/2018 1334   Temp  97.7 °F (36.5 °C) Filed at 03/05/2018 1334   SpO2  96 % Filed at 03/05/2018 1334      Patient's Most Recent Weight    Flowsheet Row Most Recent Value   Patient Weight  67.1 kg ( Allergies: No Known Allergies    Prescriptions Prior to Admission:  Fluticasone-Salmeterol (ADVAIR DISKUS IN) Inhale into the lungs. Albuterol Sulfate HFA (VENTOLIN) 108 (90 BASE) MCG/ACT Inhalation Aero Soln Inhale  into the lungs.  Indications: Asthma WBC 11.3 (H) 02/27/2018   HGB 13.9 02/27/2018   HCT 41.2 02/27/2018    02/27/2018   CREATSERUM 0.55 02/27/2018   BUN 15 02/27/2018    02/27/2018   K 3.8 02/27/2018    02/27/2018   CO2 29 02/27/2018   GLU 92 02/27/2018   CA 9.2 02/27/2018 1. Inpatient Consult to PsychIATRY Once [[id number]] ordered by Lia Glass MD at [address] [place] [address]    Report of Consultation    [facility] Patient Status:  Inpatient    1965 MRN [id number]   [employer] social with a lot of hobbies and activity. \"I am always happy and positive\". The patient today denied any homicidal or suicidal ideation. She indicated that she have tendency to have racing thought and difficulty sleeping at night.   Patient has been morphINE sulfate (PF) 2 MG/ML injection 2 mg 2 mg Intravenous Q2H PRN   Or      morphINE sulfate (PF) 4 MG/ML injection 4 mg 4 mg Intravenous Q2H PRN   acetaminophen (TYLENOL) tab 650 mg 650 mg Oral Q4H PRN   ondansetron HCl (ZOFRAN) injection 4 mg 4 mg In Blood pressure 132/72, pulse 84, temperature 97.9 °F (36.6 °C), temperature source Oral, resp. rate 16, height 62\", weight 148 lb, SpO2 97 %, not currently breastfeeding. Mental Status Exam:     The patient is alert and oriented ×3.   Stated age female Discharge Summaries - D/C Summary      Discharge Summaries signed by Parminder Carpenter MD at 3/5/2018  1:52 PM  Version 1 of 1    Author:  Parminder Carpenter MD Service:  Internal Medicine Author Type:  Physician    Filed:  3/5/2018  1:52 PM Date of [place].  For her anxiety and insomnia we continue the patient on Xanax and added Ambien as needed at bedtime. Her condition remained stable. She was continued on her regular inhalers and regular meds.   She had a satisfactory postop    Consultations: ortho 1:48 PM[[place]. 1]    Electronically signed by Samreen Contreras MD on 3/5/2018  1:52 PM   Attribution Key    [place]. 1 - Samreen Contreras MD on 3/5/2018  1:48 PM                        Physical Therapy Notes (last 72 hours) (Notes from 3/2/2018  2:33 PM through 3/5/20 Location: [facility]  Management Techniques: Activity promotion; Body mechanics; Relaxation;Repositioning    BALANCE                                                                                                                     Static Sitting: Good  Dynamic S Goal #3 Patient is able to ambulate 50 feet with assist device: walker - rolling at assistance level: modified independent   Goal #3   Current Status Amb 150'x2 with rw, SBA/CGA, NWB on R LE   Goal #4 Patient to demonstrate independence with home activity/ PT Treatment Plan: Bed mobility; Body mechanics; Patient education;Gait training;Strengthening;Transfer training;Balance training    SUBJECTIVE  Pt was agreeable to therapy session.      OBJECTIVE       WEIGHT BEARING RESTRICTION  Weight Bearing Restriction: Goals to be met by: 3/15/18  Patient Goal Patient's self-stated goal is: to go home   Goal #1 Patient is able to demonstrate supine - sit EOB @ level: modified independent   Goal #1   Current Status Mod I   Goal #2 Patient is able to demonstrate transfers rest. Pt expressed the fact that she can be restless at home and her fear is that she will be unable to truly maintain NWB on the R LE at all times thus she wanted to be placed on  a rehab facility to increase her task tolerance as well as safety awareness How much help from another person does the patient currently need. ..   -   Moving to and from a bed to a chair (including a wheelchair)?: None   -   Need to walk in hospital room?: None   -   Climbing 3-5 steps with a railing?: A Lot     AM-PAC Score:  Raw Author:  Lex Patton PT Service:  Rehab Author Type:  Physical Therapist    Filed:  3/3/2018  9:26 AM Date of Service:  3/3/2018  9:25 AM Status:  Signed    :  Lex Patton PT (Physical Therapist)       Attempted to see pt this

## (undated) NOTE — ED AVS SNAPSHOT
Unknown Aas   MRN: [id number]    Department:  [facility] Emergency Department   Date of Visit:  12/28/2017           Disclosure     Insurance plans vary and the physician(s) referred by the ER may not be covered by your plan.  Please cont CARE PHYSICIAN AT ONCE OR RETURN IMMEDIATELY TO THE EMERGENCY DEPARTMENT. If you have been prescribed any medication(s), please fill your prescription right away and begin taking the medication(s) as directed.   If you believe that any of the medications

## (undated) NOTE — Clinical Note
Discharge instructions given. Pt verbalized understanding. Pt ambulatory with steady gait. Breathing is easier.